# Patient Record
Sex: FEMALE | Race: WHITE | NOT HISPANIC OR LATINO | Employment: OTHER | URBAN - METROPOLITAN AREA
[De-identification: names, ages, dates, MRNs, and addresses within clinical notes are randomized per-mention and may not be internally consistent; named-entity substitution may affect disease eponyms.]

---

## 2017-09-24 ENCOUNTER — HOSPITAL ENCOUNTER (EMERGENCY)
Facility: HOSPITAL | Age: 67
Discharge: HOME | End: 2017-09-24
Attending: EMERGENCY MEDICINE | Admitting: EMERGENCY MEDICINE

## 2017-09-24 VITALS
OXYGEN SATURATION: 98 % | BODY MASS INDEX: 21.19 KG/M2 | SYSTOLIC BLOOD PRESSURE: 159 MMHG | WEIGHT: 135 LBS | TEMPERATURE: 98.6 F | HEIGHT: 67 IN | DIASTOLIC BLOOD PRESSURE: 108 MMHG | HEART RATE: 68 BPM | RESPIRATION RATE: 16 BRPM
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
## 2017-09-24 DIAGNOSIS — L03.113 CELLULITIS OF HAND, RIGHT: ICD-10-CM

## 2017-09-24 DIAGNOSIS — L03.114 CELLULITIS OF HAND, LEFT: ICD-10-CM

## 2017-09-24 LAB
BASOPHILS # BLD AUTO: 0.03 10*3/MM3 (ref 0–0.2)
BASOPHILS NFR BLD AUTO: 0.3 % (ref 0–1.5)
DEPRECATED RDW RBC AUTO: 52.6 FL (ref 37–54)
EOSINOPHIL # BLD AUTO: 0.1 10*3/MM3 (ref 0–0.7)
EOSINOPHIL NFR BLD AUTO: 0.8 % (ref 0.3–6.2)
ERYTHROCYTE [DISTWIDTH] IN BLOOD BY AUTOMATED COUNT: 14.5 % (ref 11.7–13)
HCT VFR BLD AUTO: 40.5 % (ref 35.6–45.5)
HGB BLD-MCNC: 13.9 G/DL (ref 11.9–15.5)
IMM GRANULOCYTES # BLD: 0.02 10*3/MM3 (ref 0–0.03)
IMM GRANULOCYTES NFR BLD: 0.2 % (ref 0–0.5)
LYMPHOCYTES # BLD AUTO: 2.28 10*3/MM3 (ref 0.9–4.8)
LYMPHOCYTES NFR BLD AUTO: 19.3 % (ref 19.6–45.3)
MCH RBC QN AUTO: 33.8 PG (ref 26.9–32)
MCHC RBC AUTO-ENTMCNC: 34.3 G/DL (ref 32.4–36.3)
MCV RBC AUTO: 98.5 FL (ref 80.5–98.2)
MONOCYTES # BLD AUTO: 0.7 10*3/MM3 (ref 0.2–1.2)
MONOCYTES NFR BLD AUTO: 5.9 % (ref 5–12)
NEUTROPHILS # BLD AUTO: 8.71 10*3/MM3 (ref 1.9–8.1)
NEUTROPHILS NFR BLD AUTO: 73.5 % (ref 42.7–76)
PLATELET # BLD AUTO: 329 10*3/MM3 (ref 140–500)
PMV BLD AUTO: 9.4 FL (ref 6–12)
RBC # BLD AUTO: 4.11 10*6/MM3 (ref 3.9–5.2)
WBC NRBC COR # BLD: 11.84 10*3/MM3 (ref 4.5–10.7)

## 2017-09-24 PROCEDURE — 90471 IMMUNIZATION ADMIN: CPT | Performed by: NURSE PRACTITIONER

## 2017-09-24 PROCEDURE — 99283 EMERGENCY DEPT VISIT LOW MDM: CPT

## 2017-09-24 PROCEDURE — 90715 TDAP VACCINE 7 YRS/> IM: CPT | Performed by: NURSE PRACTITIONER

## 2017-09-24 PROCEDURE — 25010000002 TDAP 5-2.5-18.5 LF-MCG/0.5 SUSPENSION: Performed by: NURSE PRACTITIONER

## 2017-09-24 PROCEDURE — 85025 COMPLETE CBC W/AUTO DIFF WBC: CPT | Performed by: EMERGENCY MEDICINE

## 2017-09-24 RX ORDER — HYDROCODONE BITARTRATE AND ACETAMINOPHEN 5; 325 MG/1; MG/1
1 TABLET ORAL EVERY 4 HOURS PRN
Qty: 12 TABLET | Refills: 0 | Status: SHIPPED | OUTPATIENT
Start: 2017-09-24

## 2017-09-24 RX ORDER — HYDROCODONE BITARTRATE AND ACETAMINOPHEN 7.5; 325 MG/1; MG/1
1 TABLET ORAL ONCE
Status: COMPLETED | OUTPATIENT
Start: 2017-09-24 | End: 2017-09-24

## 2017-09-24 RX ORDER — METHYLPREDNISOLONE 4 MG/1
TABLET ORAL
Qty: 21 EACH | Refills: 0 | Status: SHIPPED | OUTPATIENT
Start: 2017-09-24

## 2017-09-24 RX ORDER — CEPHALEXIN 500 MG/1
500 CAPSULE ORAL 4 TIMES DAILY
Qty: 28 CAPSULE | Refills: 0 | Status: SHIPPED | OUTPATIENT
Start: 2017-09-24

## 2017-09-24 RX ADMIN — HYDROCODONE BITARTRATE AND ACETAMINOPHEN 1 TABLET: 7.5; 325 TABLET ORAL at 10:31

## 2017-09-24 RX ADMIN — TETANUS TOXOID, REDUCED DIPHTHERIA TOXOID AND ACELLULAR PERTUSSIS VACCINE, ADSORBED 0.5 ML: 5; 2.5; 8; 8; 2.5 SUSPENSION INTRAMUSCULAR at 11:54

## 2017-09-24 NOTE — DISCHARGE INSTRUCTIONS
Medications as ordered  Elevate hands and apply ice  Monitor for increased redness,swelling, fever, chills  Follow up with pmd in 3-5 days for recheck   Return to er for fever, chills, worsening signs of infection, increased pain or any new or worsening symptoms

## 2017-09-24 NOTE — ED PROVIDER NOTES
Pt is a 67 y.o. Female reporting to the ED with insect bites, onset last night at 1900. Pt states that she was mowing the grass yesterday, when she states she noticed 8-legged black insects were on her hands after picking up a piece of wood. Pt denies fever, CP, SOA. On exam, lungs CTAB, HRRR, abd is benign. On the BUE and lateral aspect of R foot there are multiple bite marks with erythema,edema and ecchymosis with mild warmth but no drainage.     I agree with the plan to wait for lab work for further evaluation.     I supervised care provided by the midlevel provider.    We have discussed this patient's history, physical exam, and treatment plan.   I have reviewed the note and personally saw and examined the patient and agree with the plan of care.    Documentation assistance provided by earl Ramirez for Dr. Estrada.  Information recorded by the scribe was done at my direction and has been verified and validated by me.       Skip Ramirez  09/24/17 1018       Brooks Estrada MD  09/24/17 1020

## 2017-09-24 NOTE — ED PROVIDER NOTES
EMERGENCY DEPARTMENT ENCOUNTER    CHIEF COMPLAINT  Chief Complaint: insect bites  History given by: patient, family  History limited by: N/A  Room Number: 37/37  PMD: Jarett Cox MD      HPI:  Pt is a 67 y.o. female who presents with insect bites. She states that at about 1900 yesterday, while she was mowing the lawn, she picked up an old piece of wood. Afterwards, multiple insects that were small, black, and had 8 legs crawled out of the wood and bit her hands, elbows, and right foot. She notes that she had to pull the insects off her skin. Since the incident, she has had sharp pain, swelling, and redness to the insect bites. She denies drainage from the bites, fevers, chills, sensory loss, motor loss, abd pain, N/V/D, and hx of diabetes. Even though she has applied peroxide to the bites, her sx have not improved. Tetanus status is unknown. No significant Past Medical History.     Duration: sustained yesterday at about 1900  Timing: constant  Location: hands, elbows, right foot  Radiation: none  Quality: sharp pain  Intensity/Severity: moderate  Progression: unchanged  Associated Symptoms: insect bites- pain, swelling, and redness  Aggravating Factors: none  Alleviating Factors: none  Previous Episodes: none  Treatment before arrival: Even though pt has applied peroxide to the bites, her sx have not improved.    PAST MEDICAL HISTORY  Active Ambulatory Problems     Diagnosis Date Noted   • No Active Ambulatory Problems     Resolved Ambulatory Problems     Diagnosis Date Noted   • No Resolved Ambulatory Problems     No Additional Past Medical History       PAST SURGICAL HISTORY  History reviewed. No pertinent surgical history.    FAMILY HISTORY  History reviewed. No pertinent family history.    SOCIAL HISTORY  Social History     Social History   • Marital status: Single     Spouse name: N/A   • Number of children: N/A   • Years of education: N/A     Occupational History   • Not on file.     Social History  Main Topics   • Smoking status: Current Every Day Smoker     Packs/day: 1.00   • Smokeless tobacco: Not on file   • Alcohol use No   • Drug use: No   • Sexual activity: Defer     Other Topics Concern   • Not on file     Social History Narrative   • No narrative on file         ALLERGIES  Review of patient's allergies indicates not on file.    REVIEW OF SYSTEMS  Review of Systems   Constitutional: Negative for chills and fever.   HENT: Negative for sore throat.    Respiratory: Negative for shortness of breath.    Cardiovascular: Negative for chest pain.   Gastrointestinal: Negative for abdominal pain, diarrhea, nausea and vomiting.   Genitourinary: Negative for dysuria.   Musculoskeletal: Negative for back pain.   Skin: Positive for wound (insect bites to hands, elbows, right foot- pain, swelling, and redness). Negative for rash.   Neurological: Negative for dizziness, weakness and numbness.   Psychiatric/Behavioral: The patient is not nervous/anxious.        PHYSICAL EXAM  ED Triage Vitals   Temp Heart Rate Resp BP SpO2   09/24/17 0859 09/24/17 0859 09/24/17 0859 09/24/17 0904 09/24/17 0859   98.6 °F (37 °C) 89 16 175/112 99 % WNL       Physical Exam   Constitutional: She is oriented to person, place, and time and well-developed, well-nourished, and in no distress.   HENT:   Head: Normocephalic.   Mouth/Throat: Mucous membranes are normal.   Eyes: No scleral icterus.   Neck: Normal range of motion.   Cardiovascular: Normal rate, regular rhythm and normal heart sounds.    Pulmonary/Chest: Effort normal and breath sounds normal. No respiratory distress.   Musculoskeletal: Normal range of motion.   Neurological: She is alert and oriented to person, place, and time. She has normal motor skills and normal sensation.   Skin: Skin is warm and dry.   Multiple bite marks to bilateral hands, bilateral elbows, and right heel with erythema, swelling, and tenderness; no fluctuance; no drainage; no warmth   Psychiatric: Mood and  affect normal.   Nursing note and vitals reviewed.      LAB RESULTS  Recent Results (from the past 24 hour(s))   CBC Auto Differential    Collection Time: 09/24/17 10:35 AM   Result Value Ref Range    WBC 11.84 (H) 4.50 - 10.70 10*3/mm3    RBC 4.11 3.90 - 5.20 10*6/mm3    Hemoglobin 13.9 11.9 - 15.5 g/dL    Hematocrit 40.5 35.6 - 45.5 %    MCV 98.5 (H) 80.5 - 98.2 fL    MCH 33.8 (H) 26.9 - 32.0 pg    MCHC 34.3 32.4 - 36.3 g/dL    RDW 14.5 (H) 11.7 - 13.0 %    RDW-SD 52.6 37.0 - 54.0 fl    MPV 9.4 6.0 - 12.0 fL    Platelets 329 140 - 500 10*3/mm3    Neutrophil % 73.5 42.7 - 76.0 %    Lymphocyte % 19.3 (L) 19.6 - 45.3 %    Monocyte % 5.9 5.0 - 12.0 %    Eosinophil % 0.8 0.3 - 6.2 %    Basophil % 0.3 0.0 - 1.5 %    Immature Grans % 0.2 0.0 - 0.5 %    Neutrophils, Absolute 8.71 (H) 1.90 - 8.10 10*3/mm3    Lymphocytes, Absolute 2.28 0.90 - 4.80 10*3/mm3    Monocytes, Absolute 0.70 0.20 - 1.20 10*3/mm3    Eosinophils, Absolute 0.10 0.00 - 0.70 10*3/mm3    Basophils, Absolute 0.03 0.00 - 0.20 10*3/mm3    Immature Grans, Absolute 0.02 0.00 - 0.03 10*3/mm3       I ordered the above labs and reviewed the results          PROGRESS AND CONSULTS  10:08 AM- Reviewed pt's history and workup with Dr. Estrada.  At bedside evaluation, they agree with the plan of care.  10:15 AM- Ordered norco for pain.   11:29 AM- Rechecked pt. She states that her pain has improved after ED treatment. Reviewed implications of results (including mildly elevated WBC count, normal platelet count), diagnosis, meds, responsibility to follow up, warning signs and symptoms of possible worsening, potential complications and reasons to return to ER with patient.  Discussed all results and noted any abnormalities with patient.  Discussed absolute need to recheck abnormalities and condition with PMD. Informed pt of plan to prescribe abx to cover for cellulitis, steroid to help with swelling, and short course of pain medication. Advised pt to elevate BUE, to  apply ice to painful areas, and to monitor for increased redness, increased swelling, fevers, and drainage.   Discussed plan for discharge, as there is no emergent indication for admission.  Pt is agreeable and understands need for follow up and repeat testing.  Pt is aware that discharge does not mean that nothing is wrong but it indicates no emergency is present.  Pt is discharged with instructions to follow up with primary care doctor to have their blood pressure rechecked.   11:46 AM- Tetanus status is unknown. Ordered tdap.       DIAGNOSIS  Final diagnoses:   Insect bites, initial encounter   Cellulitis of hand, left   Cellulitis of hand, right       FOLLOW UP   Jarett Cox MD  [address] [id number]  [phone]    Call in 1 day        RX     Medication List      cephalexin 500 MG capsule   Commonly known as:  KEFLEX   Take 1 capsule by mouth 4 (Four) Times a Day.       HYDROcodone-acetaminophen 5-325 MG per tablet   Commonly known as:  NORCO   Take 1 tablet by mouth Every 4 (Four) Hours As Needed for Moderate Pain .       MethylPREDNISolone 4 MG tablet   Commonly known as:  MEDROL (GURWINDER)   Take as directed on package instructions.           [facility]iano report [id number] reviewed.  Risks, benefits, alternatives discussed with patient.  Pt consents to treatment and agrees to follow up with PMD tomorrow for further care and any other prescriptions.         COURSE & MEDICAL DECISION MAKING  Pertinent Labs that were ordered and reviewed are noted above.  Results were reviewed/discussed with the patient and they were also made aware of online assess.   Pt also made aware that some labs, such as cultures, will not be resulted during ER visit and follow up with PMD is necessary.     MEDICATIONS GIVEN IN ER  Medications   Tdap (BOOSTRIX) injection 0.5 mL (not administered)   HYDROcodone-acetaminophen (NORCO) 7.5-325 MG per tablet 1 tablet (1 tablet Oral Given 9/24/17 1031)       BP (!) 159/108  Pulse 68  " Temp 98.6 °F (37 °C) (Tympanic)   Resp 16  Ht 67\" (170.2 cm)  Wt 135 lb (61.2 kg)  SpO2 98%  BMI 21.14 kg/m2      I personally reviewed the past medical history, past surgical history, social history, family history, current medications and allergies as they appear in this chart.  The scribe's note accurately reflects the work and decisions made by me.     Documentation assistance provided by earl Giron for DIANE Melendez on 9/24/2017 at 11:39 AM. Information recorded by the scribe was done at my direction and has been verified and validated by me.       Lux Giron  09/24/17 1146       JAGDISH Marmolejo  09/24/17 1246    "